# Patient Record
Sex: FEMALE | Employment: STUDENT | URBAN - METROPOLITAN AREA
[De-identification: names, ages, dates, MRNs, and addresses within clinical notes are randomized per-mention and may not be internally consistent; named-entity substitution may affect disease eponyms.]

---

## 2022-01-18 ENCOUNTER — OFFICE VISIT (OUTPATIENT)
Dept: FAMILY MEDICINE | Facility: CLINIC | Age: 22
End: 2022-01-18
Payer: COMMERCIAL

## 2022-01-18 VITALS
WEIGHT: 115 LBS | HEIGHT: 62 IN | SYSTOLIC BLOOD PRESSURE: 112 MMHG | HEART RATE: 52 BPM | DIASTOLIC BLOOD PRESSURE: 54 MMHG | BODY MASS INDEX: 21.16 KG/M2

## 2022-01-18 DIAGNOSIS — M43.00 SPONDYLOLYSIS: Primary | ICD-10-CM

## 2022-01-18 DIAGNOSIS — Z13.6 SCREENING FOR HEART DISEASE: ICD-10-CM

## 2022-01-18 DIAGNOSIS — R63.4 WEIGHT LOSS: ICD-10-CM

## 2022-01-18 DIAGNOSIS — F41.9 ANXIETY: ICD-10-CM

## 2022-01-18 DIAGNOSIS — Z02.5 SPORTS PHYSICAL: ICD-10-CM

## 2022-01-18 RX ORDER — ISOTRETINOIN 10 MG/1
10 CAPSULE ORAL 2 TIMES DAILY
COMMUNITY

## 2022-01-18 ASSESSMENT — MIFFLIN-ST. JEOR: SCORE: 1249.89

## 2022-01-18 NOTE — LETTER
"  1/18/2022      RE: Elmira Mejia  [address] [facility] [address]       Elmira Mejia  Vitals: /54   Pulse 52   Ht 1.575 m (5' 2\")   Wt 52.2 kg (115 lb)   BMI 21.03 kg/m    BMI= Body mass index is 21.03 kg/m .  Sport(s): Soccer    Vision: Right Eye: 20/13 Left Eye: 20/13 Both Eyes: 20/13  Correction: none  Pupils: equal    Sickle Cell Trait: Discussed and Patient refused Sickle Cell Trait testing and signed waiver  Concussions: Concussion fact sheet reviewed. 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    General/Medical  Eyes/Vision: Normal  Ears/Hearing: Normal  Nose: Normal  Mouth/Dental: Normal  Throat: Normal  Thyroid: Normal  Lymph Nodes: Normal  Lungs: Normal  Abdomen: Normal  Skin: Normal    Musculoskeletal/Orthopaedic  Neck/Cervical: Normal  Thoracic/Lumbar: Normal  Shoulder/Upper Arm: Normal  Elbow/Forearm: Normal  Wrist/Hand/Fingers: Normal  Hip/Thigh: Normal  Knee/Patella: Normal  Lower Leg/Ankles: Normal  Foot/Toes: Normal    Cardiovascular Screening    Heart Murmur:No Grade: NA  Symmetric Femoral pulses: Yes    Stigmata of Marfan's Syndrome - if appropriate:  Not applicable    EKG: Sinus bradycardia     TRIAD Risk Factors  Low EA withor without DE/ED Some dietary restrictions, current/part history of DE   Low BMI BMI > 18.5 or > 90% EW** or weight stable   Delayed Menarche Menarche between 17 and 18 years   Oligomenorrhea and/or Amenorrhea > 9 menses in 12   Low BMD     Stress Reaction/Fracture  Specific Bone(s)  Other Bone 1  Lumbar Spine      TRIAD Score   Risk Score Status     Cumulative Risk 3 - Moderate Risk   Assessment Provisional Clearance   Medical Plan  (Nutrition and Psychology referrals. )           COMMENTS, RECOMMENDATIONS and PARTICIPATION STATUS  Cleared      1) COVID 19: No infection, +Vaccination Pfizer and booster too.  Booster 1/13/2022    2) Anxiety/Wt loss:  Fall Semester 2021 at , lost to 106 lbs, very unhappy " with soccer at .  She did not seek care.  Wouldn't eat breakfast and lunch.  Too stressed out.  Not unhappy with her size and shape.  She has worked to increase her intake on her own and feels pressure relief knowing that she will be out of that situation and playing soccer at .    Refer to Nutrition and Sports Psych.  F/u in approx 3 wks.      3) H/o lumbar spondy in 2018 with chronic intermittent LBP:  Recommend lumbar xrays AP, lateral and obliques.  No imaging since it was first diagnosed in 2018.     4) EKG reviewed, no further work-up needed.     Marilu Tapia ATC was present for the entire appt.     Sanjana Callejas MD, CAQ, FACSM, CCD  [facility]  Sports Medicine and Bone Health  Team Physician;  Athletics        Sanjana Callejas MD

## 2022-01-18 NOTE — LETTER
Date:January 19, 2022      Patient was self referred, no letter generated. Do not send.        [facility] Health Information

## 2022-01-18 NOTE — PROGRESS NOTES
"Elmira Mejia  Vitals: /54   Pulse 52   Ht 1.575 m (5' 2\")   Wt 52.2 kg (115 lb)   BMI 21.03 kg/m    BMI= Body mass index is 21.03 kg/m .  Sport(s): Soccer    Vision: Right Eye: 20/13 Left Eye: 20/13 Both Eyes: 20/13  Correction: none  Pupils: equal    Sickle Cell Trait: Discussed and Patient refused Sickle Cell Trait testing and signed waiver  Concussions: Concussion fact sheet reviewed. 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    General/Medical  Eyes/Vision: Normal  Ears/Hearing: Normal  Nose: Normal  Mouth/Dental: Normal  Throat: Normal  Thyroid: Normal  Lymph Nodes: Normal  Lungs: Normal  Abdomen: Normal  Skin: Normal    Musculoskeletal/Orthopaedic  Neck/Cervical: Normal  Thoracic/Lumbar: Normal  Shoulder/Upper Arm: Normal  Elbow/Forearm: Normal  Wrist/Hand/Fingers: Normal  Hip/Thigh: Normal  Knee/Patella: Normal  Lower Leg/Ankles: Normal  Foot/Toes: Normal    Cardiovascular Screening    Heart Murmur:No Grade: NA  Symmetric Femoral pulses: Yes    Stigmata of Marfan's Syndrome - if appropriate:  Not applicable    EKG: Sinus bradycardia     TRIAD Risk Factors  Low EA withor without DE/ED Some dietary restrictions, current/part history of DE   Low BMI BMI > 18.5 or > 90% EW** or weight stable   Delayed Menarche Menarche between 17 and 18 years   Oligomenorrhea and/or Amenorrhea > 9 menses in 12   Low BMD     Stress Reaction/Fracture  Specific Bone(s)  Other Bone 1  Lumbar Spine      TRIAD Score   Risk Score Status     Cumulative Risk 3 - Moderate Risk   Assessment Provisional Clearance   Medical Plan  (Nutrition and Psychology referrals. )           COMMENTS, RECOMMENDATIONS and PARTICIPATION STATUS  Cleared      1) COVID 19: No infection, +Vaccination Pfizer and booster too.  Booster 1/13/2022    2) Anxiety/Wt loss:  Fall Semester 2021 at , lost to 106 lbs, very unhappy with soccer at .  She did not seek care.  Wouldn't eat breakfast and lunch.  Too " stressed out.  Not unhappy with her size and shape.  She has worked to increase her intake on her own and feels pressure relief knowing that she will be out of that situation and playing soccer at .    Refer to Nutrition and Sports Psych.  F/u in approx 3 wks.      3) H/o lumbar spondy in 2018 with chronic intermittent LBP:  Recommend lumbar xrays AP, lateral and obliques.  No imaging since it was first diagnosed in 2018.     4) EKG reviewed, no further work-up needed.     Marilu Tapia ATC was present for the entire appt.     Sanjana Callejas MD, CAQ, FACSM, CCD  [facility]  Sports Medicine and Bone Health  Team Physician;  Athletics

## 2022-01-19 ENCOUNTER — ANCILLARY PROCEDURE (OUTPATIENT)
Dept: GENERAL RADIOLOGY | Facility: CLINIC | Age: 22
End: 2022-01-19
Attending: FAMILY MEDICINE
Payer: COMMERCIAL

## 2022-01-19 DIAGNOSIS — M43.00 SPONDYLOLYSIS: ICD-10-CM

## 2022-01-19 LAB
ATRIAL RATE - MUSE: 56 BPM
DIASTOLIC BLOOD PRESSURE - MUSE: NORMAL MMHG
INTERPRETATION ECG - MUSE: NORMAL
P AXIS - MUSE: 43 DEGREES
PR INTERVAL - MUSE: 130 MS
QRS DURATION - MUSE: 84 MS
QT - MUSE: 398 MS
QTC - MUSE: 384 MS
R AXIS - MUSE: 66 DEGREES
SYSTOLIC BLOOD PRESSURE - MUSE: NORMAL MMHG
T AXIS - MUSE: 40 DEGREES
VENTRICULAR RATE- MUSE: 56 BPM

## 2022-01-19 PROCEDURE — 72110 X-RAY EXAM L-2 SPINE 4/>VWS: CPT | Performed by: RADIOLOGY

## 2022-02-04 ENCOUNTER — DOCUMENTATION ONLY (OUTPATIENT)
Dept: FAMILY MEDICINE | Facility: CLINIC | Age: 22
End: 2022-02-04

## 2022-02-04 DIAGNOSIS — L24.5 IRRITANT CONTACT DERMATITIS DUE TO OTHER CHEMICAL PRODUCTS: Primary | ICD-10-CM

## 2022-02-04 RX ORDER — TRIAMCINOLONE ACETONIDE 1 MG/G
OINTMENT TOPICAL 2 TIMES DAILY
Qty: 15 G | Refills: 0 | Status: SHIPPED | OUTPATIENT
Start: 2022-02-04 | End: 2022-10-04

## 2022-02-07 NOTE — PROGRESS NOTES
"[facility] ATHLETIC MEDICINE  [facility]   Sport Psychology Intake Note      Date of Visit: 2/4/22  Duration of Session: 60 minutes  Referred by:     Elmira Mejia presented on time for initial telehealth/videoconference. Elmira Mejia was located at the following address for the appointment: [address] [place] [address]     Emergency contacts provided:  General: Nelsy Mejia, Mother, [phone]  In-person: Ilene Burger, [phone]     The risks and benefits of telehealth and what to do if there is a break in the connection were reviewed. Physical environment/space was confirmed to be secure and private on both ends. The session was both audio and visual on [employer], a secure system that is HIPAA compliant/certified. The telehealth consent form was signed prior to the session; see signed form in chart. The nature and limits of confidentiality, were discussed and Elmira Mejia stated understanding and consent.      Elmira Mejia is a 21 year old Choose not to answer female.  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 She is not an international student.     Self-reported Concerns/Symptoms:  Anxiety/worry  Confidence  Identity  Other: Mental Management     Presenting Concern:  \"I went through a tough time during my season/semester at [employer]\" (and clt is looking to stay on top of her mental health so she doesn't find herself in the same place as before).    Suicide and Risk Assessment:  Recent suicidal thoughts: No  Past suicidal thoughts: No  Recent homicidal thoughts: No  Any attempts in the past: No  Any family/friends/loved ones die by suicide: No  Plan or considering various methods: No  Access to guns: No  Protective factors: no h/o suicide attempt, no plan or intent, no h/o risky impulsive behavior, h/o seeking help when needed, future oriented, feeling hopeful, commitment to family and good social support  " "      Elmira denies current urges to self-harm, homicidal ideation, suicidal ideation, means, plans, or intent.    Mental Status & Observations:  Elmira appeared generally alert and oriented. Dress was appropriate to the weather and occasion. Grooming and hygiene were appropriate. Eye contact was good. Speech was of normal volume and normal. Thought processes were relevant, logical and goal-directed. Thought content was within normal limits with no evidence of psychotic or paranoid features. Memory appeared intact. She exhibited normal motor activity during the appointment. Mood was appropriate with congruent affect. Insight and judgment appeared age appropriate with good focus in session.  Behavior was cooperative, engaging and open    Family Background:  Reji reported being from a supportive family in [place] which consists of her mom, step-dad, dad, step-mom, and 5 siblings (including a twin sister). Reji shared being extremely close to her dad, mom and step-dad specifically, but feeling loved and supported by all.    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 Reji shared overall being a good student and having no current academic concerns.    Social:  Reji noted her family is her number one support system and she sees them often when they attends games or come to visit her at school. Reji also noted building strong and close relationships with her current [employer] teammates and finding her transition her very smooth.    Athletics:   [employer] (Mid/FWD)  Reji shared she has been playing soccer for ~15 years and recently transferred to [employer] from [place] due to a \"toxic\"enviornment that wasn't the right fit for her. Reji shared mentally she went on a downward spiral after not playing/not knowing her role which led her to transfer to [employer]. Reji shared being happy with her transition thus far and making great connections to her " teammates/coaches.     History of medical and mental health concerns:  Concussion: No  Current/past sports injury: Yes: stress fracture in back (~4 yrs ago) and a pelvic bone injury that she noted still lingers from time to time.  Nutrition/eating/appetite: No  Body image: No  Sleep: No   Substance use (alcohol, caffeine, tobacco, cannabis, other): Yes: Clt shared drinking alcohol infrequently and denied ay current/past drug use. Clt indicated zero concerns re: her substance use.  Family history of substance abuse: No  Medications/vitamins/supplements: N/A  Concentration/focus/ADHD: No  Caffeine use: Yes  PTSD/trauma/abuse: No  Significant loss: No  Known history of mental health in self: Yes, clt noted a hx of self dx anxiety and low moods directly related to sport performance.  History of therapy or prescribed medications: No  Known history of mental health in family member(s): No  Legal issues: No  Financial concerns: No   Receives partial scholarship  Gabriella/Hobbies/Personality:    Identifies as Confucianism   Reported hobbies consist of music, golfing, swimming TV, and hanging with friends.    Coping skills, strengths and supports:   Communication skills, Family support, Motivation for change and Use of available services    Goals for counseling:  Stay on top of mental health and find more meaning/purpose outside of sport to help with identity development.    Clinical impressions or Other:  Reji presents with good insight re: her presenting concerns and interest in wanting to remain on top of her mental health with her awareness of how poor performance drastically impacts her mentally/emotionally/physically.     Therapy objectives/goals:  Build resilience and response to adversity  Enhance life balance  Increase self-esteem and self-worth  Increase willingness to be vulnerable and experience emotions  Lessen perfectionism  Provide support  Separate self-worth from outcome/achievement    Therapy follow-up  plan:  Individual counseling sessions monthly (2x/month)    Clt's next appointment was scheduled for Monday, 2/14 @ 9 AM.      Laney Guevara MA, LPC

## 2022-02-11 ENCOUNTER — OFFICE VISIT (OUTPATIENT)
Dept: FAMILY MEDICINE | Facility: CLINIC | Age: 22
End: 2022-02-11
Payer: COMMERCIAL

## 2022-02-11 VITALS
BODY MASS INDEX: 21.16 KG/M2 | WEIGHT: 115 LBS | DIASTOLIC BLOOD PRESSURE: 69 MMHG | HEIGHT: 62 IN | SYSTOLIC BLOOD PRESSURE: 113 MMHG | HEART RATE: 56 BPM

## 2022-02-11 DIAGNOSIS — L24.5 IRRITANT CONTACT DERMATITIS DUE TO OTHER CHEMICAL PRODUCTS: ICD-10-CM

## 2022-02-11 DIAGNOSIS — L02.416 CELLULITIS AND ABSCESS OF LEFT LEG: Primary | ICD-10-CM

## 2022-02-11 DIAGNOSIS — L03.116 CELLULITIS AND ABSCESS OF LEFT LEG: Primary | ICD-10-CM

## 2022-02-11 RX ORDER — SULFAMETHOXAZOLE/TRIMETHOPRIM 800-160 MG
1 TABLET ORAL 2 TIMES DAILY
Qty: 10 TABLET | Refills: 0 | Status: SHIPPED | OUTPATIENT
Start: 2022-02-11 | End: 2022-10-04

## 2022-02-11 RX ORDER — METHYLPREDNISOLONE 4 MG
TABLET, DOSE PACK ORAL
Qty: 21 TABLET | Refills: 0 | Status: SHIPPED | OUTPATIENT
Start: 2022-02-11 | End: 2022-10-04

## 2022-02-11 RX ORDER — HYDROXYZINE HYDROCHLORIDE 25 MG/1
25 TABLET, FILM COATED ORAL 3 TIMES DAILY PRN
Qty: 21 TABLET | Refills: 0 | Status: SHIPPED | OUTPATIENT
Start: 2022-02-11 | End: 2022-10-04

## 2022-02-11 ASSESSMENT — MIFFLIN-ST. JEOR: SCORE: 1249.89

## 2022-02-11 NOTE — LETTER
Date:February 11, 2022      Patient was self referred, no letter generated. Do not send.        [facility] Health Information

## 2022-02-11 NOTE — LETTER
2/11/2022      RE: Elmira Mejia  [address] [facility] [address]       HISTORY OF PRESENT ILLNESS  Ms. Mejia is a pleasant 21 year old year old female who presents to clinic today with right ankle and foot rash and itching  Had developed a blister over her distal achilles last week and began getting taped and became sensitive to the adhesive topical  Has started topical kenalog which seems to help some  But still has rash and blister has not healed    MEDICAL HISTORY  Patient Active Problem List   Diagnosis     Spondylolysis     Weight loss     Anxiety       Current Outpatient Medications   Medication Sig Dispense Refill     hydrOXYzine (ATARAX) 25 MG tablet Take 1 tablet (25 mg) by mouth 3 times daily as needed for itching 21 tablet 0     methylPREDNISolone (MEDROL DOSEPAK) 4 MG tablet therapy pack Follow Package Directions 21 tablet 0     sulfamethoxazole-trimethoprim (BACTRIM DS) 800-160 MG tablet Take 1 tablet by mouth 2 times daily 10 tablet 0     ISOtretinoin (ACCUTANE) 10 MG capsule Take 10 mg by mouth 2 times daily       triamcinolone (KENALOG) 0.1 % external ointment Apply topically 2 times daily 15 g 0       No Known Allergies    No family history on file.  Social History     Socioeconomic History     Marital status: Single     Spouse name: Not on file     Number of children: Not on file     Years of education: Not on file     Highest education level: Not on file   Occupational History     Not on file   Tobacco Use     Smoking status: Not on file     Smokeless tobacco: Not on file   Substance and Sexual Activity     Alcohol use: Not on file     Drug use: Not on file     Sexual activity: Not on file   Other Topics Concern     Not on file   Social History Narrative     Not on file     Social Determinants of Health     Financial Resource Strain: Not on file   Food Insecurity: Not on file   Transportation Needs: Not on file   Physical Activity: Not on file   Stress: Not on file   Social  "Connections: Not on file   Intimate Partner Violence: Not on file   Housing Stability: Not on file       Additional medical/Social/Surgical histories reviewed in [facility] and updated as appropriate.     REVIEW OF SYSTEMS (2/11/2022)  10 point ROS of systems including Constitutional, Eyes, Respiratory, Cardiovascular, Gastroenterology, Genitourinary, Integumentary, Musculoskeletal, Psychiatric, Allergic/Immunologic were all negative except for pertinent positives noted in my HPI.     PHYSICAL EXAM  Vitals:    02/11/22 0846   BP: 113/69   Pulse: 56   Weight: 52.2 kg (115 lb)   Height: 1.575 m (5' 2\")     Vital Signs: /69   Pulse 56   Ht 1.575 m (5' 2\")   Wt 52.2 kg (115 lb)   LMP 01/25/2022 (Approximate)   BMI 21.03 kg/m   Patient declined being weighed. Body mass index is 21.03 kg/m .    General  - normal appearance, in no obvious distress  HEENT  - conjunctivae not injected, moist mucous membranes, normocephalic/atraumatic head, ears normal appearance, no lesions, mouth normal appearance, no scars, normal dentition and teeth present  CV  - normal pulses at posterior tib and dorsalis pedis  Pulm  - normal respiratory pattern, non-labored  Musculoskeletal - right ankle/foot  - stance: normal gait  - inspection: no swelling laterally, normal bone and joint alignment, no obvious deformity  - palpation: tenderness over distal achilles at blister, which is >0.5cm and not fully healed but is deroofed, no tenderness over lateral or medial malleoli, navicular, or base of 5th MT  - ROM: intact globally but not limited secondary to pain  - strength: 5/5 in eversion, 5/5 in all other planes    Neuro  - no sensory or motor deficit, grossly normal coordination, normal muscle tone  Skin  - rash that is overlying ankle and top of foot, ttp over blister over distal achilles  Has patchy raised rash and consistent erythema surrounding blister  Psych  - interactive, appropriate, normal mood and affect  ASSESSMENT & PLAN  20 yo " female with irritant contact dermatitis on right foot and ankle, and unhealed blister concerning for cellulitis    RX Given for medrol and bactrim DS   Cont. Topical steroid PRN  hydroxizine PRN for pruritis  F/u if not improving over the next few days  Appropriate PPE was utilized for prevention of spread of Covid-19.  Jono Zamora MD, CAQSM        Jono Zamora MD

## 2022-02-11 NOTE — PROGRESS NOTES
HISTORY OF PRESENT ILLNESS  Ms. Mejia is a pleasant 21 year old year old female who presents to clinic today with right ankle and foot rash and itching  Had developed a blister over her distal achilles last week and began getting taped and became sensitive to the adhesive topical  Has started topical kenalog which seems to help some  But still has rash and blister has not healed    MEDICAL HISTORY  Patient Active Problem List   Diagnosis     Spondylolysis     Weight loss     Anxiety       Current Outpatient Medications   Medication Sig Dispense Refill     hydrOXYzine (ATARAX) 25 MG tablet Take 1 tablet (25 mg) by mouth 3 times daily as needed for itching 21 tablet 0     methylPREDNISolone (MEDROL DOSEPAK) 4 MG tablet therapy pack Follow Package Directions 21 tablet 0     sulfamethoxazole-trimethoprim (BACTRIM DS) 800-160 MG tablet Take 1 tablet by mouth 2 times daily 10 tablet 0     ISOtretinoin (ACCUTANE) 10 MG capsule Take 10 mg by mouth 2 times daily       triamcinolone (KENALOG) 0.1 % external ointment Apply topically 2 times daily 15 g 0       No Known Allergies    No family history on file.  Social History     Socioeconomic History     Marital status: Single     Spouse name: Not on file     Number of children: Not on file     Years of education: Not on file     Highest education level: Not on file   Occupational History     Not on file   Tobacco Use     Smoking status: Not on file     Smokeless tobacco: Not on file   Substance and Sexual Activity     Alcohol use: Not on file     Drug use: Not on file     Sexual activity: Not on file   Other Topics Concern     Not on file   Social History Narrative     Not on file     Social Determinants of Health     Financial Resource Strain: Not on file   Food Insecurity: Not on file   Transportation Needs: Not on file   Physical Activity: Not on file   Stress: Not on file   Social Connections: Not on file   Intimate Partner Violence: Not on file   Housing Stability: Not on  "file       Additional medical/Social/Surgical histories reviewed in [facility] and updated as appropriate.     REVIEW OF SYSTEMS (2/11/2022)  10 point ROS of systems including Constitutional, Eyes, Respiratory, Cardiovascular, Gastroenterology, Genitourinary, Integumentary, Musculoskeletal, Psychiatric, Allergic/Immunologic were all negative except for pertinent positives noted in my HPI.     PHYSICAL EXAM  Vitals:    02/11/22 0846   BP: 113/69   Pulse: 56   Weight: 52.2 kg (115 lb)   Height: 1.575 m (5' 2\")     Vital Signs: /69   Pulse 56   Ht 1.575 m (5' 2\")   Wt 52.2 kg (115 lb)   LMP 01/25/2022 (Approximate)   BMI 21.03 kg/m   Patient declined being weighed. Body mass index is 21.03 kg/m .    General  - normal appearance, in no obvious distress  HEENT  - conjunctivae not injected, moist mucous membranes, normocephalic/atraumatic head, ears normal appearance, no lesions, mouth normal appearance, no scars, normal dentition and teeth present  CV  - normal pulses at posterior tib and dorsalis pedis  Pulm  - normal respiratory pattern, non-labored  Musculoskeletal - right ankle/foot  - stance: normal gait  - inspection: no swelling laterally, normal bone and joint alignment, no obvious deformity  - palpation: tenderness over distal achilles at blister, which is >0.5cm and not fully healed but is deroofed, no tenderness over lateral or medial malleoli, navicular, or base of 5th MT  - ROM: intact globally but not limited secondary to pain  - strength: 5/5 in eversion, 5/5 in all other planes    Neuro  - no sensory or motor deficit, grossly normal coordination, normal muscle tone  Skin  - rash that is overlying ankle and top of foot, ttp over blister over distal achilles  Has patchy raised rash and consistent erythema surrounding blister  Psych  - interactive, appropriate, normal mood and affect  ASSESSMENT & PLAN  20 yo female with irritant contact dermatitis on right foot and ankle, and unhealed blister " concerning for cellulitis    RX Given for medrol and bactrim DS   Cont. Topical steroid PRN  hydroxizine PRN for pruritis  F/u if not improving over the next few days  Appropriate PPE was utilized for prevention of spread of Covid-19.  Jono Zamora MD, CAM

## 2022-02-14 ENCOUNTER — DOCUMENTATION ONLY (OUTPATIENT)
Dept: FAMILY MEDICINE | Facility: CLINIC | Age: 22
End: 2022-02-14

## 2022-02-14 NOTE — PROGRESS NOTES
[facility] ATHLETIC MEDICINE  [facility]   Sport Psychology No Show Note      Elmira Mejia no-showed their Sport Psychology appointment scheduled to take place at 9 AM on 2/14/22.     The student-athlete was notified via email of their missed sport psychology appointment, a description of the No-Show policy within Athletics, and guidance on how to reschedule, if interested.      Laney Guevara MA, LPC

## 2022-04-26 ENCOUNTER — LAB (OUTPATIENT)
Dept: LAB | Facility: CLINIC | Age: 22
End: 2022-04-26
Payer: COMMERCIAL

## 2022-04-26 DIAGNOSIS — L70.9 ACNE: ICD-10-CM

## 2022-04-26 LAB
ALBUMIN SERPL-MCNC: 3.9 G/DL (ref 3.4–5)
ALP SERPL-CCNC: 56 U/L (ref 40–150)
ALT SERPL W P-5'-P-CCNC: 18 U/L (ref 0–50)
ANION GAP SERPL CALCULATED.3IONS-SCNC: 6 MMOL/L (ref 3–14)
AST SERPL W P-5'-P-CCNC: 20 U/L (ref 0–35)
B-HCG SERPL-ACNC: <1 IU/L (ref 0–5)
BASOPHILS # BLD AUTO: 0 10E3/UL (ref 0–0.2)
BASOPHILS NFR BLD AUTO: 0 %
BILIRUB SERPL-MCNC: 1.1 MG/DL (ref 0.2–1.3)
BUN SERPL-MCNC: 16 MG/DL (ref 7–30)
CALCIUM SERPL-MCNC: 9.4 MG/DL (ref 8.5–10.1)
CHLORIDE BLD-SCNC: 107 MMOL/L (ref 96–110)
CHOLEST SERPL-MCNC: 191 MG/DL
CO2 SERPL-SCNC: 26 MMOL/L (ref 20–32)
CREAT SERPL-MCNC: 0.78 MG/DL (ref 0.5–1)
EOSINOPHIL # BLD AUTO: 0.1 10E3/UL (ref 0–0.7)
EOSINOPHIL NFR BLD AUTO: 1 %
ERYTHROCYTE [DISTWIDTH] IN BLOOD BY AUTOMATED COUNT: 13.5 % (ref 10–15)
GFR SERPL CREATININE-BSD FRML MDRD: >90 ML/MIN/1.73M2
GLUCOSE BLD-MCNC: 91 MG/DL (ref 70–99)
HCT VFR BLD AUTO: 40.4 % (ref 35–47)
HGB BLD-MCNC: 12.5 G/DL (ref 11.7–15.7)
IMM GRANULOCYTES # BLD: 0 10E3/UL
IMM GRANULOCYTES NFR BLD: 0 %
LYMPHOCYTES # BLD AUTO: 2 10E3/UL (ref 0.8–5.3)
LYMPHOCYTES NFR BLD AUTO: 26 %
MCH RBC QN AUTO: 26.3 PG (ref 26.5–33)
MCHC RBC AUTO-ENTMCNC: 30.9 G/DL (ref 31.5–36.5)
MCV RBC AUTO: 85 FL (ref 78–100)
MONOCYTES # BLD AUTO: 0.5 10E3/UL (ref 0–1.3)
MONOCYTES NFR BLD AUTO: 6 %
NEUTROPHILS # BLD AUTO: 4.9 10E3/UL (ref 1.6–8.3)
NEUTROPHILS NFR BLD AUTO: 67 %
NRBC # BLD AUTO: 0 10E3/UL
NRBC BLD AUTO-RTO: 0 /100
PLATELET # BLD AUTO: 262 10E3/UL (ref 150–450)
POTASSIUM BLD-SCNC: 4.1 MMOL/L (ref 3.4–5.3)
PROT SERPL-MCNC: 7.9 G/DL (ref 6.8–8.8)
RBC # BLD AUTO: 4.76 10E6/UL (ref 3.8–5.2)
SODIUM SERPL-SCNC: 139 MMOL/L (ref 133–144)
WBC # BLD AUTO: 7.5 10E3/UL (ref 4–11)

## 2022-04-26 PROCEDURE — 36415 COLL VENOUS BLD VENIPUNCTURE: CPT | Performed by: PATHOLOGY

## 2022-04-26 PROCEDURE — 82465 ASSAY BLD/SERUM CHOLESTEROL: CPT | Performed by: PATHOLOGY

## 2022-04-26 PROCEDURE — 80053 COMPREHEN METABOLIC PANEL: CPT | Performed by: PATHOLOGY

## 2022-04-26 PROCEDURE — 84702 CHORIONIC GONADOTROPIN TEST: CPT | Performed by: PATHOLOGY

## 2022-04-26 PROCEDURE — 85025 COMPLETE CBC W/AUTO DIFF WBC: CPT | Performed by: PATHOLOGY

## 2022-09-19 ENCOUNTER — DOCUMENTATION ONLY (OUTPATIENT)
Dept: FAMILY MEDICINE | Facility: CLINIC | Age: 22
End: 2022-09-19

## 2022-10-04 ENCOUNTER — OFFICE VISIT (OUTPATIENT)
Dept: FAMILY MEDICINE | Facility: CLINIC | Age: 22
End: 2022-10-04
Payer: COMMERCIAL

## 2022-10-04 VITALS
DIASTOLIC BLOOD PRESSURE: 59 MMHG | HEIGHT: 63 IN | WEIGHT: 117 LBS | SYSTOLIC BLOOD PRESSURE: 119 MMHG | BODY MASS INDEX: 20.73 KG/M2 | HEART RATE: 33 BPM

## 2022-10-04 DIAGNOSIS — M54.2 NECK PAIN: Primary | ICD-10-CM

## 2022-10-04 NOTE — LETTER
Date:October 7, 2022      Patient was self referred, no letter generated. Do not send.        [facility] Health Information

## 2022-10-04 NOTE — PROGRESS NOTES
"[facility] ATHLETICS  Denae ATC initial assessment note  Date of service performed: 9/19/22    Concern: Chronic injury  Body part: Back  Description: Thoracic  Injury: Neuropathy  Type: Athletics related  Date of injury: Chronic    S: SR comes in today c/o mid-back pain. She states she has been feeling \"tight\" lately and wanted to try cupping or Graston to help loosen her scapulothoracic muscles. No history of injury. Has not had problems lifting weights or training for soccer.     O: SR has fairly good posture, slightly forward shoulders. No signs of deformity or bruising to area. Shoulder AROM and PROM WNL. Ath has an area just medial to her left scapula that has less sensation than her right side. She is very ticklish and does not like being touched.     A: Neuropathy vs. Muscular adhesions    P: Performed light manual therapy (massage, Graston) to bilateral rhomboid/lower trapezius areas. May refer to MD to discuss numbness if ath feels uncomfortable moving forward.     Shanda Tapia, ATC          "

## 2022-10-04 NOTE — LETTER
10/4/2022      RE: Elmira Mejia  [address] [address]     Dear Colleague,    Thank you for referring your patient, Elmira Mejia, to the [facility]UD[facility]. Please see a copy of my visit note below.    S: 23 yo female  presents for:     -Sharp pain right below her neck on the RIGHT side. Brief min.  Feels numb too.   -Worse with wearing a backpack  -Prolonged sitting increases pain  -Doesn't hurt to play  -Symptoms for years sharp pain, numbness for months  -Saw a chiro in HS:  Temporary, Minimal benefit  -Her dad wanted her to be seen.   -Denies trauma to her neck  -Plays midfield and rarely heads the ball.  -She's not sure what makes it better    Current Outpatient Medications   Medication     ISOtretinoin (ACCUTANE) 10 MG capsule     No current facility-administered medications for this visit.         O: NAD  Posture:  Rounded shoulders and forward flexed head position, correctable with cueing  Neck:  FROM with some pain with extension and rotation combined and lateral side bending  Neg Spurling's  Neg Garcia's  +ttp over the C5-6 spinous processes and even more tender just lateral to the spinous processes.  Very tight musculature in this area of tenderness, otherwise nttp over SP from C2-C4 and completely nttp over the LEFT side of the cervical spine.   Strength 5/5 B UE  Reflexes biceps, triceps and brachioradialis 2+ = B      A:1.  C5-6 central and right sided sharp pain (for years) and numbness for month with associated cervical myofascial pain  2. Poor posture    PLAN:  I have recommended cervical spine xrays including obliques to better assess her facet joints.  May need advanced imaging if xrays are unrevealing.   Trial of heat and voltaren gel.   She doesn't like massages but can self massage.   She wouldn't tolerate graston or lacrosse ball treatment.  Trial of vibration and heat combined.   Posture:  Anterior chest wall stretching on yoga ball or foam  roller.  Strengthening of rhomboids and lower traps.     Marilu Tapia ATC for  Soccer was also present for the entire appt.     Examination:  XR CERVICAL SPINE G/E 4 VIEWS     Date:  10/5/2022 12:36 PM      Clinical Information: AP, lateral and obliques    with  RIGHT central C5-6 pain for years with months of new numbness.  No  radiation. No trauma.  Plays midfield and rarely heads a ball.; Neck  pain      Comparison: None available     Findings:      Upright AP, lateral, and bilateral oblique views of the cervical spine  were obtained. Normal alignment of the cervical spine. Vertebral body  height and morphology is preserved. Normal disc spacing. No evidence  of fracture. No significant neural foraminal stenosis on oblique  views. No significant degenerative changes. No pneumothorax or focal  air space opacities within the visualized lung apices.                                                                      Impression:  Normal evaluation of the cervical spine without evidence of  degenerative changes.     I have personally reviewed the examination and initial interpretation  and I agree with the findings.     MD Sanjana ALBA MD, CAQ, FACSM, CCD  [facility]  Sports Medicine and Bone Health  Team Physician;  Athletics            Again, thank you for allowing me to participate in the care of your patient.      Sincerely,    Sanjana Callejas MD

## 2022-10-04 NOTE — PROGRESS NOTES
S: 23 yo female  presents for:     -Sharp pain right below her neck on the RIGHT side. Brief min.  Feels numb too.   -Worse with wearing a backpack  -Prolonged sitting increases pain  -Doesn't hurt to play  -Symptoms for years sharp pain, numbness for months  -Saw a chiro in HS:  Temporary, Minimal benefit  -Her dad wanted her to be seen.   -Denies trauma to her neck  -Plays midfield and rarely heads the ball.  -She's not sure what makes it better    Current Outpatient Medications   Medication     ISOtretinoin (ACCUTANE) 10 MG capsule     No current facility-administered medications for this visit.         O: NAD  Posture:  Rounded shoulders and forward flexed head position, correctable with cueing  Neck:  FROM with some pain with extension and rotation combined and lateral side bending  Neg Spurling's  Neg Garcia's  +ttp over the C5-6 spinous processes and even more tender just lateral to the spinous processes.  Very tight musculature in this area of tenderness, otherwise nttp over SP from C2-C4 and completely nttp over the LEFT side of the cervical spine.   Strength 5/5 B UE  Reflexes biceps, triceps and brachioradialis 2+ = B      A:1.  C5-6 central and right sided sharp pain (for years) and numbness for month with associated cervical myofascial pain  2. Poor posture    PLAN:  I have recommended cervical spine xrays including obliques to better assess her facet joints.  May need advanced imaging if xrays are unrevealing.   Trial of heat and voltaren gel.   She doesn't like massages but can self massage.   She wouldn't tolerate graston or lacrosse ball treatment.  Trial of vibration and heat combined.   Posture:  Anterior chest wall stretching on yoga ball or foam roller.  Strengthening of rhomboids and lower traps.     Marilu Tapia, RICHIE for  Soccer was also present for the entire appt.     Examination:  XR CERVICAL SPINE G/E 4 VIEWS     Date:  10/5/2022 12:36 PM      Clinical Information: AP,  lateral and obliques    with  RIGHT central C5-6 pain for years with months of new numbness.  No  radiation. No trauma.  Plays midfield and rarely heads a ball.; Neck  pain      Comparison: None available     Findings:      Upright AP, lateral, and bilateral oblique views of the cervical spine  were obtained. Normal alignment of the cervical spine. Vertebral body  height and morphology is preserved. Normal disc spacing. No evidence  of fracture. No significant neural foraminal stenosis on oblique  views. No significant degenerative changes. No pneumothorax or focal  air space opacities within the visualized lung apices.                                                                      Impression:  Normal evaluation of the cervical spine without evidence of  degenerative changes.     I have personally reviewed the examination and initial interpretation  and I agree with the findings.     MD Sanjana ALBA MD, CAQ, FACSM, CCD  [facility]  Sports Medicine and Bone Health  Team Physician;  Athletics

## 2022-10-05 ENCOUNTER — ANCILLARY PROCEDURE (OUTPATIENT)
Dept: GENERAL RADIOLOGY | Facility: CLINIC | Age: 22
End: 2022-10-05
Attending: FAMILY MEDICINE
Payer: COMMERCIAL

## 2022-10-05 DIAGNOSIS — M54.2 NECK PAIN: ICD-10-CM

## 2022-10-05 PROCEDURE — 72050 X-RAY EXAM NECK SPINE 4/5VWS: CPT | Mod: GC | Performed by: RADIOLOGY

## 2022-10-12 DIAGNOSIS — M54.2 NECK PAIN WITHOUT INJURY: Primary | ICD-10-CM

## 2022-10-21 ENCOUNTER — ANCILLARY PROCEDURE (OUTPATIENT)
Dept: MRI IMAGING | Facility: CLINIC | Age: 22
End: 2022-10-21
Attending: FAMILY MEDICINE
Payer: COMMERCIAL

## 2022-10-21 DIAGNOSIS — M54.2 NECK PAIN WITHOUT INJURY: ICD-10-CM

## 2022-10-21 PROCEDURE — 72141 MRI NECK SPINE W/O DYE: CPT | Performed by: RADIOLOGY

## 2022-10-27 NOTE — PROGRESS NOTES
[facility] ATHLETICS  Denae ATC follow-up note  Date of service performed: 10/7/22    Concern/injury: Thoracic back pain    Assessment/plan: I referred SR to Dr. Callejas to get a second opinion. We obtained cspine XR which are normal. Dr. Callejas thinks this may be an arthritis in her facet joints. We will obtain MRI next.     Shanda Tapia, ATC

## 2022-10-27 NOTE — PROGRESS NOTES
[facility] ATHLETICS  Denae ATC follow-up note  Date of service performed: 10/14/22    Concern/injury: Thoracic back pain    Assessment/plan: MRI is normal. I have asked Dr. Callejas to review imaging and compare to SR physical exam. Next steps awaiting reply.     Shanda Tapia ATC

## 2022-11-02 NOTE — PROGRESS NOTES
[facility] ATHLETICS  Denae ATC follow-up note  Date of service performed: 11/2/22    Concern/injury: Back/neck pain    Assessment/plan: SR has not been compliant with treatment plan. She has not done any of the things we discussed in appointment (heat/massage, voltaren, postural PT). I will have a discussion with her now that soccer season is over and we will follow up with Dr. Callejas after two weeks of consistent treatment.     Shanda Tapia, ATC

## 2022-12-03 NOTE — PROGRESS NOTES
[facility] ATHLETICS  Denae ATC follow-up note  Date of service performed: 12/2/22    Concern/injury: Thoracic back tightness    Assessment/plan: SR only came in two days this week. Will continue treatment and follow up with Dr. Callejas before SR goes back home for winter break.     Shanda Tapia ATC

## 2022-12-03 NOTE — PROGRESS NOTES
[facility] ATHLETICS  Denae ATC follow-up note  Date of service performed: 11/18/22    Concern/injury: Thoracic back tightness    Assessment/plan: SR completed three days of treatment this week, consisting of postural rehab, US with Voltaren, and some light soft tissue work. Soccer is off next week and SR will be home in [place] for the full week. I sent her home with a HEP to complete and we will resume 3 days/week treatment upon her return.     Shanda Tapia, ATC

## 2022-12-12 ENCOUNTER — OFFICE VISIT (OUTPATIENT)
Dept: FAMILY MEDICINE | Facility: CLINIC | Age: 22
End: 2022-12-12
Payer: COMMERCIAL

## 2022-12-12 DIAGNOSIS — Z53.9 ERRONEOUS ENCOUNTER--DISREGARD: Primary | ICD-10-CM

## 2022-12-12 NOTE — LETTER
Date:December 15, 2022      Patient was self referred, no letter generated. Do not send.        [facility] Health Information

## 2022-12-12 NOTE — LETTER
12/12/2022      RE: Elmira Mejia  [address] [facility] [id number]     Dear Colleague,    Thank you for referring your patient, Elmira Mejia, to the [facility]. Please see a copy of my visit note below.    Cancelled.    Sanjana Callejas MD, CAQ, FACSM, CCD  [facility]  Sports Medicine and Bone Health  Team Physician;  Athletics        Again, thank you for allowing me to participate in the care of your patient.      Sincerely,    Sanjana Callejas MD

## 2022-12-14 NOTE — PROGRESS NOTES
Cancelled.    Sanjana Callejas MD, CAQ, FACSM, CCD  [facility]  Sports Medicine and Bone Health  Team Physician;  Athletics

## 2023-01-11 NOTE — PROGRESS NOTES
"[facility] ATHLETICS  Denae ATC follow-up note  Date of service performed: 12/8/22    Concern/injury: Cervical/thoracic pain    Assessment/plan: SR has been very compliant with treatment and postural rehab this week. She states it is the best her \"shoulder\" has ever felt. She will be working with a family friend PT at home during winter break. I am curious to see how she feels after break having workouts to do on her own. We have f/u with Dr. Callejas on Monday to discuss further planning.     Shanda Tapia, ATC      "

## 2023-01-11 NOTE — PROGRESS NOTES
[facility] ATHLETICS  Denae ATC follow-up note  Date of service performed: 22    Concern/injury: Cervical/thoracic pain    Assessment/plan: SR no-showed for her follow up appt today. She states her phone  and she slept in by accident. She also says her shoulder was quite painful today. We will re-schedule f/u for when SR returns from winter break.     Shanda Tapia ATC

## 2023-01-17 ENCOUNTER — OFFICE VISIT (OUTPATIENT)
Dept: FAMILY MEDICINE | Facility: CLINIC | Age: 23
End: 2023-01-17
Payer: COMMERCIAL

## 2023-01-17 VITALS
DIASTOLIC BLOOD PRESSURE: 68 MMHG | HEART RATE: 70 BPM | BODY MASS INDEX: 21.76 KG/M2 | HEIGHT: 63 IN | WEIGHT: 122.8 LBS | SYSTOLIC BLOOD PRESSURE: 108 MMHG

## 2023-01-17 DIAGNOSIS — M54.2 NECK PAIN: Primary | ICD-10-CM

## 2023-01-17 NOTE — PROGRESS NOTES
S: 22  female  presents for follow-up of R chronic neck pain felt to be myofascial in nature due to unremarkable xrays and cervical spine MRI in the area of her pain.     -Felt great over Winter Break when she wasn't wearing a backpack.  She was playing soccer during this time.   -Had her backpack on yesterday and she felt it again  -Her neck was improving with the recommended treatment from her last appointment:  heat and voltaren gel  Self massage.   She doesn't tolerate graston or lacrosse ball treatment.    Vibration and heat combined help.   Posture:  Anterior chest wall stretching on yoga ball or foam roller.  Strengthening of rhomboids and lower traps.         O: NAD  Posture:  Rounded shoulders and forward flexed head position, correctable with cueing  Neck:  FROM without pain  Neg Spurling's  nttp over the C5-6 spinous processes + tenderness just lateral to the spinous processes of C5-6.   tight musculature in this area of tenderness, but much less than previously.        A:1.  C5-6 central and right sided sharp pain (for years) and numbness for month with associated cervical myofascial pain  2. Poor posture     PLAN:  She is doing much better.  Discussed ways to avoid wearing her backpack on that side and/or making it lighter.  Continue the current treatment plan.  We could add dry needling or consider trigger point injections but she isn't interested in those options at this time.        Marilu Tapia ATC for  Soccer was also present for the entire appt.     Sanjana Callejas MD, CAQ, FACSM, CCD  [facility]  Sports Medicine and Bone Health  Team Physician;  Athletics

## 2023-01-17 NOTE — LETTER
Date:January 23, 2023      Patient was self referred, no letter generated. Do not send.        [facility] Health Information

## 2023-01-17 NOTE — LETTER
1/17/2023      RE: Elmira Mejia  [address] [address]     Dear Colleague,    Thank you for referring your patient, Elmira Mejia, to the [facility] STUDENT ATHLETIC CLINIC. Please see a copy of my visit note below.    S: 22  female  presents for follow-up of R chronic neck pain felt to be myofascial in nature due to unremarkable xrays and cervical spine MRI in the area of her pain.     -Felt great over Winter Break when she wasn't wearing a backpack.  She was playing soccer during this time.   -Had her backpack on yesterday and she felt it again  -Her neck was improving with the recommended treatment from her last appointment:  heat and voltaren gel  Self massage.   She doesn't tolerate graston or lacrosse ball treatment.    Vibration and heat combined help.   Posture:  Anterior chest wall stretching on yoga ball or foam roller.  Strengthening of rhomboids and lower traps.         O: NAD  Posture:  Rounded shoulders and forward flexed head position, correctable with cueing  Neck:  FROM without pain  Neg Spurling's  nttp over the C5-6 spinous processes + tenderness just lateral to the spinous processes of C5-6.   tight musculature in this area of tenderness, but much less than previously.        A:1.  C5-6 central and right sided sharp pain (for years) and numbness for month with associated cervical myofascial pain  2. Poor posture     PLAN:  She is doing much better.  Discussed ways to avoid wearing her backpack on that side and/or making it lighter.  Continue the current treatment plan.  We could add dry needling or consider trigger point injections but she isn't interested in those options at this time.        Marilu Tapia ATC for  Soccer was also present for the entire appt.     Sanjana Callejas MD, CAQ, FACSM, CCD  [facility]  Sports Medicine and Bone Health  Team Physician;  Athletics                   Again, thank you for allowing me to participate in  the care of your patient.      Sincerely,    Sanjana Callejas MD

## 2023-02-01 NOTE — PROGRESS NOTES
[facility] ATHLETICS  Denae ATC follow-up note  Date of service performed: 1/17/23    Concern/injury: Cervical/shoulder tightness    Assessment/plan: We followed up with Dr. Callejas today. SR has had no pain through winter break. She did notice slight pain since starting to wear her backpack again. We will continue postural rehab and soft tissue work as needed.     Shanda Tapia, ATC

## 2023-08-03 ENCOUNTER — DOCUMENTATION ONLY (OUTPATIENT)
Dept: FAMILY MEDICINE | Facility: CLINIC | Age: 23
End: 2023-08-03

## 2023-08-03 DIAGNOSIS — R58 ECCHYMOSIS: Primary | ICD-10-CM

## 2023-08-03 DIAGNOSIS — M79.674 PAIN IN TOE OF RIGHT FOOT: ICD-10-CM

## 2023-08-04 ENCOUNTER — ANCILLARY PROCEDURE (OUTPATIENT)
Dept: GENERAL RADIOLOGY | Facility: CLINIC | Age: 23
End: 2023-08-04
Attending: FAMILY MEDICINE
Payer: COMMERCIAL

## 2023-08-04 DIAGNOSIS — M79.674 PAIN IN TOE OF RIGHT FOOT: ICD-10-CM

## 2023-08-04 DIAGNOSIS — R58 ECCHYMOSIS: ICD-10-CM

## 2023-08-04 PROCEDURE — 73660 X-RAY EXAM OF TOE(S): CPT | Mod: RT | Performed by: RADIOLOGY

## 2023-08-14 NOTE — PROGRESS NOTES
[facility] ATHLETICS  Denae ATC follow-up note  Date of service performed: 8/4/23    Concern/injury: R 3rd toe    Assessment/plan: Spoke with Dr. Neal and showed him pictures of bruising on toe. We decided to get an X-ray to rule out fracture. XR negative. Will continue buddy tape for support/comfort, and rehab as needed.     Shanda Tapia, ATC

## 2023-08-14 NOTE — PROGRESS NOTES
[facility] ATHLETICS  Denae ATC follow-up note  Date of service performed: 8/5/23    Concern/injury: R 3rd toe    Assessment/plan: Ath feels great today. She has discontinued princess taping and feels okay to walk, run, play soccer. No concerns.     Shanda Tapia, ATC

## 2023-08-14 NOTE — PROGRESS NOTES
[facility] ATHLETICS  Denae ATC initial assessment note  Date of service performed: 8/3/23    Concern: Acute injury  Body part: Toe  Description: Right  Injury: Sprain  Type: Athletics related  Date of injury: 8/3/23    S: Michelle came to me after practice today with pain in her right 3rd toe. She states she shanked a ball and it hurt immediately. She was able to finish practice, but through some pain. No history of foot/toe/ankle injuries. Pain with walking after practice. Ath is wearing newer cleats.     O: No edema, no ecchymosis, no deformity. AROM WNL, PROM WNL, RROM WNL with pain in toe flexion. Long bone compression (+) pain. After a few hours, athlete developed bruising around 3rd MCP joint on dorsal and plantar aspects.     A: Phalanx fracture vs. sprain    P: Ice, princess tape, XR to rule out fx.     Shanda Tapia, ATC

## 2023-11-07 ENCOUNTER — DOCUMENTATION ONLY (OUTPATIENT)
Dept: FAMILY MEDICINE | Facility: CLINIC | Age: 23
End: 2023-11-07

## 2023-11-21 NOTE — PROGRESS NOTES
[facility] ATHLETICS  Denae QUIROZ follow-up note  Date of service performed: 11/20/23    Concern/injury: L ankle sprain    Assessment/plan: SR did some individual technical work with her  today and was not taped. It went well and she said she had no pain. She did come in for rehab afterward and looked great. Her swelling and ecchymosis are gone and ankle feels stable and strong.     Shanda Tapia, ATC

## 2023-11-21 NOTE — PROGRESS NOTES
"[facility] ATHLETICS  [facility] ATC initial assessment note  Date of service performed: 11/14/23    Concern: Acute injury  Body part: Ankle  Description: Left  Injury: Sprain  Type: Athletics related  Date of injury: 11/14/23    S: Ath comes in after practice today c/o L ankle pain. She states she went to reach for a ball with her right foot and her left was stuck in the turf. Does not recall a \"pop\" or other sensation, just pain. Ath was able to finish practice with no restrictions. No history of ankle injuries. Ath had normal gait pattern when walking into training room.     O: No obvious deformity. TTP just distal to medial malleolus. Denies TTP on navicular, posterior tibialis tendon or other significant structures. ROM WNL but painful with active eversion. PROM WNL. RROM 5/5 all ranges and pain-free. Anterior drawer (-), Posterior drawer (-), Bump (-), Squeeze (-), Talar tilt (-), Kleiger's (+) pain without laxity. Denies N&T.     A: Medial ankle sprain    P: Gave athlete compression sleeve and instructed her to do some light ROM at home tonight. Applied ice for 20'.  Will re-assess tomorrow and likely tape for practice.     Shanda Tapia ATC          "

## 2023-11-21 NOTE — PROGRESS NOTES
[facility] ATHLETICS  Denae ATC follow-up note  Date of service performed: 11/16/23    Concern/injury: L ankle sprain    Assessment/plan: SR appears improved today from yesterday. She is able to do rehab without pain. Today I did a lighter taping technique and she felt good. The team are off until Tuesday next week.     Shanda Tapia, ATC

## 2023-11-21 NOTE — PROGRESS NOTES
[facility] ATHLETICS  Denae ATC follow-up note  Date of service performed: 11/15/23    Concern/injury: L ankle sprain    Assessment/plan: SR comes in today to re-assess her L ankle. Her pain has improved and she continues to have a normal gait pattern. She has some ecchymosis through entire medial ankle, with mild swelling throughout as well. ROM WNL, RROM 5/5 throughout. Ath is able to do SL calf raise with no pain. Stable ankle upon exam. I guided Elmira through some ankle strengthening and proprioceptive exercises and taped her ankle for training. She did well and did not need any modifications for training or conditioning. Will progress as tolerated and continue taping for practice tomorrow.     Shanda Tapia, ATC

## 2024-04-29 ENCOUNTER — DOCUMENTATION ONLY (OUTPATIENT)
Dept: FAMILY MEDICINE | Facility: CLINIC | Age: 24
End: 2024-04-29

## 2024-05-13 NOTE — PROGRESS NOTES
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 Elmira brought up her right shoulder/periscapular area that we have treated on and off occasionally. She has no major concerns at this time, it is not affecting ADL, but wants to continue treatment for it. No ZOE, no significant history involving shoulder/neck/thoracic spine. 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     O: No visible deformity. AROM WNL, PROM WNL, rotator cuff 5/5 all ranges. No pain with breathing. T-spine rotation is limited in all ranges bilaterally. No TTP of spinal landmarks or scapula. Palpable knot just medial to superior angle of scapula.     A: Muscle spasm    P: Will treat with Graston technique, manual massage and possibly chiropractor treatment using Activator device without joint manipulation.     Shanda Tapia, ATC

## 2024-09-08 ENCOUNTER — DOCUMENTATION ONLY (OUTPATIENT)
Dept: FAMILY MEDICINE | Facility: CLINIC | Age: 24
End: 2024-09-08

## 2024-09-12 NOTE — PROGRESS NOTES
[facility] ATHLETICS  Denae QUIROZ follow-up note  Date of service performed: 9/10/24    Concern/injury: L MCL sprain    Assessment/plan: Today SR feels improved. Her valgus test does not cause pain and is not loose. Today I taped her knee with MCL tape and it was sliding down, so we removed it and SR continued training with no issues.     Shanda Tapia, ATC

## 2024-09-12 NOTE — PROGRESS NOTES
[facility] ATHLETICS  Denae QUIROZ follow-up note  Date of service performed: 9/9/24    Concern/injury: L knee sprain    Assessment/plan: Today I re-evaluated SR L knee. My findings are consistent with a Grade I MCL sprain. Her special testing is the same as yesterday mid-game. Today I applied US with Voltaren gel 6' and had SR perform adductor and quad exercises. She tolerated these well.     Shanda Tapia, ATC

## 2024-09-12 NOTE — PROGRESS NOTES
"[facility] ATHLETICS  Denae ATC initial assessment note  Date of service performed: 9/8/24    Concern: Acute injury  Body part: Knee  Description: Left  Injury: Sprain  Type: Athletics related  Date of injury: 9/8/24    S:  spoke to me during halftime that her L knee was \"tweaked\" by an opposing player running through her from the lateral side. She played most of the first half, rotating normally for her position. No significant history of knee injury.     O: No obvious deformity. AROM WNL. Lachman's (-), Anterior drawer (-), posterior drawer (-), Valgus (+) pain without laxity, Varus (-), Dieter's (-). Denies N&T.     A: Grade 1 MCL sprain L knee    P: I applied an MCL tape job to SR for the second half and she was able to perform with no restrictions. Will re-evaluate tomorrow.     Shanda Tapia, ATC        "

## 2024-09-12 NOTE — PROGRESS NOTES
[facility] ATHLETICS  Denae ATC follow-up note  Date of service performed: 9/11/24    Concern/injury: L knee MCL sprain    Assessment/plan: Today SR L knee feels stable and is not painful. She was able to train with no restrictions without taping. Will treat as needed, but consider this injury resolved.     Shanda Tapia, ATC

## 2024-11-25 ENCOUNTER — DOCUMENTATION ONLY (OUTPATIENT)
Dept: FAMILY MEDICINE | Facility: CLINIC | Age: 24
End: 2024-11-25

## 2024-11-25 NOTE — PROGRESS NOTES
[facility] ATHLETICS  Denae ATC initial assessment note  Date of service performed: 11/25/24    Concern: Acute injury  Body part: Ankle  Description: Right  Injury: Sprain  Type: Athletics related  Date of injury: 11/1/24    S: A few weeks ago,  suffered a mild lateral ankle sprain. She was stable upon exam and did not miss time. We taped her ankle for a few practices and the past four games.  has been stable and fully functional and did not miss time this season. She does not recall exact ZOE.     O: AROM WNL, PROM WNL, RROM 5/5 all ranges. Anterior drawer (-), posterior drawer (-), Kleiger's (-), Bump (-), squeeze (-), talar tilt (-). Stable ankle upon exam.     A: Contusion vs. Sprain of lateral ankle.     P: Tape as needed.     Shanda Tapia, ATC

## 2024-11-26 ENCOUNTER — OFFICE VISIT (OUTPATIENT)
Dept: FAMILY MEDICINE | Facility: CLINIC | Age: 24
End: 2024-11-26
Payer: COMMERCIAL

## 2024-11-26 ENCOUNTER — ANCILLARY PROCEDURE (OUTPATIENT)
Dept: GENERAL RADIOLOGY | Facility: CLINIC | Age: 24
End: 2024-11-26
Attending: FAMILY MEDICINE
Payer: COMMERCIAL

## 2024-11-26 VITALS
BODY MASS INDEX: 21.26 KG/M2 | WEIGHT: 120 LBS | HEIGHT: 63 IN | HEART RATE: 58 BPM | SYSTOLIC BLOOD PRESSURE: 130 MMHG | DIASTOLIC BLOOD PRESSURE: 81 MMHG

## 2024-11-26 DIAGNOSIS — S90.01XS CONTUSION OF RIGHT ANKLE, SEQUELA: ICD-10-CM

## 2024-11-26 DIAGNOSIS — S83.412S SPRAIN OF MEDIAL COLLATERAL LIGAMENT OF LEFT KNEE, SEQUELA: Primary | ICD-10-CM

## 2024-11-26 DIAGNOSIS — S90.31XS CONTUSION OF RIGHT FOOT, SEQUELA: ICD-10-CM

## 2024-11-26 PROCEDURE — 73630 X-RAY EXAM OF FOOT: CPT | Mod: RT | Performed by: RADIOLOGY

## 2024-11-26 PROCEDURE — 73610 X-RAY EXAM OF ANKLE: CPT | Mod: RT | Performed by: RADIOLOGY

## 2024-11-26 RX ORDER — SPIRONOLACTONE 50 MG/1
50 TABLET, FILM COATED ORAL DAILY
COMMUNITY

## 2024-11-26 NOTE — PROGRESS NOTES
ASSESSMENT/PLAN:    (S83.412S) Sprain of medial collateral ligament of left knee, sequela  (primary encounter diagnosis)  Comment: still w/ some residual pain/ mild laxity on exam; will place PT order to start in offseason  Plan: Physical Therapy  Referral          (S90.01XS) Contusion of right ankle, sequela  Comment: does have distal fibula and cuboid pain so will check x-ray; if neg, start PT for that in the offseason as well  Plan: XR Ankle Right G/E 3 Views, Physical Therapy  Referral          (S90.31XS) Contusion of right foot, sequela  Comment: see above  Plan: X-ray rt Foot G/E 3 vws*, Physical Therapy  Referral          Gideon Holguin MD  November 26, 2024  2:07 PM      Pt is a 24 year old female  last seen on 1/17/2023 by Dr Callejas here for follow up of:       R Foot/Ankle pain:  NO concerns  Location: lateral   Duration:mid-season   Trauma/ Fall? Unsure if it was practice or game   Able to walk? YES  Swelling? YES - initially  Bruising? YES - initially  Numbness/ Tingling? NO   Weakness? NO   Instability? NO  Snapping/Clicking? NO  Imaging? NO   Treatment? Taping, rehab; no boot or brace      L Knee pain : No concerns   Duration? Early in season had grade I MCL sprain -> no missed time   Swelling? NO   Limited motion? NO   Locking/ Catching? NO   Giving way/ instability? No   Imaging? NO   Treatment? Rehab/ taping        No past medical history on file.   Current Outpatient Medications   Medication Sig Dispense Refill    spironolactone (ALDACTONE) 50 MG tablet Take 50 mg by mouth daily.      ISOtretinoin (ACCUTANE) 10 MG capsule Take 10 mg by mouth 2 times daily (Patient not taking: Reported on 11/26/2024)        No Known Allergies   ROS:   Gen- no fevers/chills   Rheum - no morning stiffness   Derm - no rash/ redness   Neuro - no numbness, no tingling   Remainder of ROS negative.     Exam:       R Foot/Ankle:   Inspection: Swelling - NO; Bruising - no   Sensation:  intact in peroneal, tibial, sural, and saphenous distribution   ROM: Dorsiflexion - full; Plantarflexion - full; Inversion -full; Eversion - full;    Strength: 5/5 in all motions; NO Pain w/ resisted motions  Bony tenderness: Medial malleolus? - NO; Lateral malleolus? - YES; Navicular? - NO; 5th Metatarsal? - no; Other - POS for Cuboid  Ligaments Tenderness: ATFL/CFL -NO; Deltoid - no; Syndesmosis - no; LisFranc - no  Tendons: Posterior Tibialis - no; Peroneals - no; Achilles - no   Maneuvers: Anterior Drawer - neg; Talar Tilt - neg; Squeeze - neg; Hop - neg; Baez - intact      L Knee:   ROM: 0-130; Crepitus: No   Effusion: NO ; Swelling: no   Strength: Full in flexion/ extension   Tenderness: Patella - no Medial joint line - NO; Lateral joint line - no; Quad tendon - no; Patellar tendon- no; Hamstring - no; +TTP at medial femoral condyle and medial tibia.   Cruciates: anterior drawer - neg/posterior drawer -neg. Lachman - neg   Collaterals: varus -neg/valgus -POS.   Patella: patellar compression - neg; single leg bend- neg   Meniscus: Dieter - neg; Thessaly - neg   Maneuvers: Hang - neg